# Patient Record
(demographics unavailable — no encounter records)

---

## 2017-02-10 NOTE — GOP
[f 
rep st]



                                                                OPERATIVE REPORT





DATE OF OPERATION:  02/10/2017



SURGEON:  YE Miramontes MD



ASSISTANT:  Soo Miramontes PA-C.



ANESTHESIA:  Spinal.



PREOPERATIVE DIAGNOSIS:  Right knee osteoarthritis.



POSTOPERATIVE DIAGNOSIS:  Right knee osteoarthritis.



PROCEDURE PERFORMED:  Right total knee arthroplasty.



FINDINGS/PATHOLOGY:  Severe tricompartmental osteoarthritis.  



ESTIMATED BLOOD LOSS:  30 cc.



INDICATIONS:  This is a 71-year-old male with severe and progressive pain and 
deformity of the right knee unresponsive to conservative care.  Risks and 
benefits of the surgical intervention were explained in detail.



DESCRIPTION OF PROCEDURE:  The patient was brought to the operative room and 
placed on the table in the supine position.  Spinal anesthesia was induced 
without difficulty.  A pneumatic tourniquet was applied about the right 
proximal thigh, and the leg was prepped and draped in a sterile fashion.  The 
leg mittal was applied.  After exsanguination by elevation the tourniquet was 
inflated to 275 mm of mercury. 



Incision was made anterior medial from the tibial tuberosity to a point 2 cm 
proximal to the superior pole of the patella.  Medial parapatellar arthrotomy 
was carried out from the superior pole of the patella and posteriorly in line 
with the fibers of the Type II VMO.  The medial collateral ligament was 
elevated and the infrapatellar fat pad was resected. 



The patella was everted and the articular surface was excised.  A 38 mm 
patellar button was placed. The distal femoral guide hole was drilled and the 6 
degree alignment jack was placed.  A 10 mm distal femoral cut was made without 
difficulty. 



Attention was turned to the tibia and a standard 9 mm cut based on the lateral 
tibial condyle was performed.  The tibial articular surface was excised without 
difficulty. 



Attention was turned back to the femur and a size 7 Triathlon femoral cutting 
block was positioned.  Anterior, posterior, and chamfer cuts were made, 
followed by the intercondylar box cut. 



The knee was extended and the remnants of the medial and lateral meniscus were 
excised.  The posterior capsule was injected with ropivacaine, epinephrine and 
Toradol.  A size 7 MIS mini-keel tibial tray was positioned.  Trial reduction 
was then carried out.  There was excellent range of motion, alignment, and 
stability using the 9 mm polyethylene. 



All trials were then removed.  The joint was thoroughly irrigated and carefully 
dried.  Two packages of cement and 2 grams of vancomycin were mixed in the 
vacuum mixer and placed on the fixation surfaces of all surfaces of the 
components.  The components were implanted and all excess cement was thoroughly 
removed.  The permanent 9 mm polyethylene X3 was placed without difficulty.  



The tourniquet was deflated and all bleeders were coagulated.  The wound was 
thoroughly irrigated and closed using interrupted sutures of 2-0 Vicryl for the 
joint capsule.  The subcu was closed with 3-0 Vicryl and the skin with 4-0 
Monocryl.  Dermabond and Steri-Strips were applied followed by a compressive 
dressing.  The patient was then moved from the operating room to the recovery 
room in good condition, having tolerated the procedure well.





Job #:  [id number]

MTDD

## 2017-02-10 NOTE — DX
Portable Right Knee, AP and Lateral Views, Three Views Total, at 8:58 a.m.



Clinical History: 71-year-old male in the PACU after a knee arthroplasty.



Comparison Study: Right knee, dated November 14, 2007.



Findings: The patient has undergone a tricomponent knee arthroplasty, with anatomic alignment of the 
distal femoral, proximal tibial, and posterior patellar components. There is normally expected air wi
thin the soft tissues. A pneumatic cuff is seen over the calf.



Impression: Anatomic alignment following a tricomponent knee arthroplasty.

## 2017-02-10 NOTE — POSTOPPROG
Post Op Note


Date of Operation: 02/10/17


Surgeon: OSMANY Dimas


Assistant: yamil dimas


Anesthesiologist: dr. johnson


Anesthesia: Spinal, Other (Specify) (adductor canal block)


Pre-op Diagnosis: right knee OA


Post-op Diagnosis: same


Indication: right knee pain due to OA that failed conservative measures


Procedure: R TKA


Findings: severe knee OA


Inf/Abcess present in the surg proc area at time of surgery?: No


EBL:

## 2017-02-11 NOTE — SOAPPROG
SOAP Progress Note


Assessment/Plan: 


Assessment:





Alex is doing well POD 1 s/p R TKA


pain management: pain is well controlled on oral pain meds


VTE ppx: recommend coumadin and lovenox, INR today 1.2. cont DEMARCO and SCD


anemia: level expected initially postop, asymptomatic. continue to monitor


D/c planning: d/c to home today


spasms: script written for flexeril.  handed to patient's wife today.




















Plan:








02/11/17 09:00





Subjective: 


Alex is doing well today, denies SOB, chest pain and N/V


Objective: 





 Vital Signs











Temp Pulse Resp BP Pulse Ox


 


 36.9 C   67   18   116/68   93 


 


 02/11/17 07:33  02/11/17 07:33  02/11/17 07:33  02/11/17 07:33  02/11/17 07:33








 Laboratory Results





 02/11/17 04:25 





 











 02/10/17 02/11/17 02/12/17





 05:59 05:59 05:59


 


Intake Total  2850 


 


Output Total  1150 275


 


Balance  1700 -275








 











PT  15.3 SEC (12.0-15.0)  H  02/11/17  04:25    


 


INR  1.21  (0.83-1.16)  H  02/11/17  04:25    








RLE: incision dressing is clean and dry, NVI, +pf/df





ICD10 Worksheet


Patient Problems: 


 Problems











Problem Status Diagnosed


 


Primary localized osteoarthritis of right knee Acute

## 2017-02-16 NOTE — GDS
[f rep st]



                                                             DISCHARGE SUMMARY





ADMISSION DIAGNOSIS:  Right knee osteoarthritis.



DISCHARGE DIAGNOSIS:  Right knee osteoarthritis.



PROCEDURE:  Right total knee arthroplasty.



VTE PROPHYLAXIS:  Coumadin and Lovenox recommended.



BRIEF DESCRIPTION OF HOSPITAL STAY:  Patient was admitted for an elective joint arthroplasty.  The p
atient tolerated the procedure well and has passed physical therapy.  The patient was given appropri
ate antibiotic prophylaxis and venous thromboembolism prophylaxis.  The patient's pain was well cont
rolled on oral pain medication, patient was holding down food, and had urinated.  Decision was made 
to discharge the patient.  The patient was given post-operative prescriptions pre-operatively.



PLAN:  To follow up with Dr. Miramontes at [facility] in 2 to 3 weeks.





Job #:  [id number]